# Patient Record
Sex: FEMALE | Race: WHITE | Employment: UNEMPLOYED | URBAN - METROPOLITAN AREA
[De-identification: names, ages, dates, MRNs, and addresses within clinical notes are randomized per-mention and may not be internally consistent; named-entity substitution may affect disease eponyms.]

---

## 2022-01-02 ENCOUNTER — APPOINTMENT (OUTPATIENT)
Dept: CT IMAGING | Age: 67
End: 2022-01-02
Payer: MEDICARE

## 2022-01-02 ENCOUNTER — HOSPITAL ENCOUNTER (EMERGENCY)
Age: 67
Discharge: LEFT BEFORE DISCHARGE | End: 2022-01-02

## 2022-01-02 ENCOUNTER — APPOINTMENT (OUTPATIENT)
Dept: GENERAL RADIOLOGY | Age: 67
End: 2022-01-02
Payer: MEDICARE

## 2022-01-02 ENCOUNTER — HOSPITAL ENCOUNTER (EMERGENCY)
Age: 67
Discharge: HOME | End: 2022-01-02
Attending: SPECIALIST
Payer: MEDICARE

## 2022-01-02 VITALS
WEIGHT: 136 LBS | RESPIRATION RATE: 15 BRPM | HEIGHT: 64 IN | DIASTOLIC BLOOD PRESSURE: 88 MMHG | SYSTOLIC BLOOD PRESSURE: 147 MMHG | OXYGEN SATURATION: 96 % | HEART RATE: 90 BPM | BODY MASS INDEX: 23.22 KG/M2 | TEMPERATURE: 98 F

## 2022-01-02 VITALS
DIASTOLIC BLOOD PRESSURE: 86 MMHG | WEIGHT: 136 LBS | HEIGHT: 64 IN | OXYGEN SATURATION: 96 % | SYSTOLIC BLOOD PRESSURE: 162 MMHG | HEART RATE: 89 BPM | BODY MASS INDEX: 23.22 KG/M2 | TEMPERATURE: 98.6 F | RESPIRATION RATE: 18 BRPM

## 2022-01-02 DIAGNOSIS — S16.1XXA STRAIN OF NECK MUSCLE, INITIAL ENCOUNTER: ICD-10-CM

## 2022-01-02 DIAGNOSIS — S09.90XA CLOSED HEAD INJURY, INITIAL ENCOUNTER: Primary | ICD-10-CM

## 2022-01-02 DIAGNOSIS — S39.012A STRAIN OF LUMBAR REGION, INITIAL ENCOUNTER: ICD-10-CM

## 2022-01-02 PROCEDURE — 6370000000 HC RX 637 (ALT 250 FOR IP)

## 2022-01-02 PROCEDURE — 99283 EMERGENCY DEPT VISIT LOW MDM: CPT

## 2022-01-02 PROCEDURE — 72100 X-RAY EXAM L-S SPINE 2/3 VWS: CPT

## 2022-01-02 PROCEDURE — 72125 CT NECK SPINE W/O DYE: CPT

## 2022-01-02 PROCEDURE — 70450 CT HEAD/BRAIN W/O DYE: CPT

## 2022-01-02 RX ORDER — ACETAMINOPHEN 160 MG/5ML
SOLUTION ORAL
Status: COMPLETED
Start: 2022-01-02 | End: 2022-01-02

## 2022-01-02 RX ORDER — ACETAMINOPHEN 325 MG/1
650 TABLET ORAL ONCE
Status: DISCONTINUED | OUTPATIENT
Start: 2022-01-02 | End: 2022-01-02

## 2022-01-02 RX ORDER — ACETAMINOPHEN 160 MG/5ML
640 SOLUTION ORAL ONCE
Status: COMPLETED | OUTPATIENT
Start: 2022-01-02 | End: 2022-01-02

## 2022-01-02 RX ORDER — IBUPROFEN 600 MG/1
600 TABLET ORAL EVERY 6 HOURS PRN
Qty: 20 TABLET | Refills: 0 | Status: SHIPPED | OUTPATIENT
Start: 2022-01-02 | End: 2022-01-09

## 2022-01-02 RX ADMIN — ACETAMINOPHEN 650 MG: 160 SOLUTION ORAL at 21:36

## 2022-01-02 RX ADMIN — ACETAMINOPHEN 650 MG: 650 SOLUTION ORAL at 21:36

## 2022-01-02 ASSESSMENT — PAIN DESCRIPTION - DESCRIPTORS
DESCRIPTORS: POUNDING
DESCRIPTORS: ACHING

## 2022-01-02 ASSESSMENT — PAIN DESCRIPTION - LOCATION
LOCATION: HEAD;NECK
LOCATION: HEAD;COCCYX

## 2022-01-02 ASSESSMENT — PAIN DESCRIPTION - ORIENTATION
ORIENTATION: POSTERIOR
ORIENTATION: POSTERIOR

## 2022-01-02 ASSESSMENT — PAIN DESCRIPTION - PAIN TYPE
TYPE: ACUTE PAIN
TYPE: ACUTE PAIN

## 2022-01-02 ASSESSMENT — PAIN SCALES - GENERAL
PAINLEVEL_OUTOF10: 5
PAINLEVEL_OUTOF10: 8

## 2022-01-02 ASSESSMENT — PAIN DESCRIPTION - FREQUENCY: FREQUENCY: CONTINUOUS

## 2022-01-02 NOTE — ED NOTES
Pt left immediately after triage. Pt did not want to wait any longer.      Ru Castaneda, MARCK  01/02/22 9227

## 2022-01-03 NOTE — ED PROVIDER NOTES
past medical history of ALS (amyotrophic lateral sclerosis) ([facility] [address] SURGICAL HISTORY      has no past surgical history on file. CURRENT MEDICATIONS       Discharge Medication List as of 1/2/2022  9:28 PM          ALLERGIES     has No Known Allergies. FAMILY HISTORY     has no family status information on file. family history is not on file. SOCIAL HISTORY          PHYSICAL EXAM     INITIAL VITALS:  height is 5' 4.25\" (1.632 m) and weight is 61.7 kg (136 lb). Her oral temperature is 98.6 °F (37 °C). Her blood pressure is 162/86 (abnormal) and her pulse is 89. Her respiration is 18 and oxygen saturation is 96%. Physical Exam  Vitals and nursing note reviewed. Constitutional:       Appearance: She is well-developed. HENT:      Head: Normocephalic. Contusion present. No raccoon eyes or Christopher's sign. Nose: Nose normal.   Eyes:      Extraocular Movements: Extraocular movements intact. Pupils: Pupils are equal, round, and reactive to light. Cardiovascular:      Rate and Rhythm: Normal rate and regular rhythm. Heart sounds: Normal heart sounds. No murmur heard. Pulmonary:      Effort: Pulmonary effort is normal. No respiratory distress. Breath sounds: Normal breath sounds. Abdominal:      General: Bowel sounds are normal. There is no distension. Palpations: Abdomen is soft. Tenderness: There is no abdominal tenderness. Musculoskeletal:      Cervical back: Normal range of motion and neck supple. Spinous process tenderness and muscular tenderness present. Comments: Patient has vague tenderness in the lower lumbar region more in the left paraspinal area. There is no step-off defect. There is no point tenderness. Skin:     General: Skin is warm and dry. Findings: Abrasion present. Comments: Patient has abrasion in the right olecranon process area. She has full range of movements of the right elbow joint without any pain or discomfort. Neurological:      General: No focal deficit present. Mental Status: She is alert and oriented to person, place, and time. DIFFERENTIAL DIAGNOSIS/ MDM:     Closed head injury, intracranial bleed, cervical strain, lumbar strain, fracture, right elbow abrasion    DIAGNOSTIC RESULTS     EKG: All EKG's are interpreted by the Emergency Department Physician who either signs or Co-signs this chart in the absence of a cardiologist.    None obtained    RADIOLOGY:   Interpretation per the Radiologist below, if available at the time of this note:    XR LUMBAR SPINE (2-3 VIEWS)    Result Date: 1/2/2022  EXAMINATION: THREE XRAY VIEWS OF THE LUMBAR SPINE 1/2/2022 4:49 pm COMPARISON: None. HISTORY: ORDERING SYSTEM PROVIDED HISTORY: Low back pain S/P Fall TECHNOLOGIST PROVIDED HISTORY: Low back pain S/P Fall Reason for Exam: low back pain, fall on ice today. Relevant Medical/Surgical History: hx of ALS FINDINGS: No fractures are seen. There is mild anterior subluxation of L4 on L5. There is disc space narrowing with eburnation of the vertebral endplates at the T1-3, N3-3, L3-4, L4-5 and L5-S1 levels. There is sclerosis of the facet joints in the mid and lower lumbar spine. The posterior elements are otherwiseintact. The paraspinal soft tissues are unremarkableexcept for atherosclerotic changes. No acute bony abnormalities are noted Grade 1 spondylolisthesis of L4 on L5. Multilevel spondylosis and degenerative disc disease. Facet arthropathy     CT Head WO Contrast    Result Date: 1/2/2022  EXAMINATION: CT OF THE CERVICAL SPINE WITHOUT CONTRAST; CT OF THE HEAD WITHOUT CONTRAST 1/2/2022 4:52 pm TECHNIQUE: CT of the cervical spine was performed without the administration of intravenous contrast. Multiplanar reformatted images are provided for review. Dose modulation, iterative reconstruction, and/or weight based adjustment of the mA/kV was utilized to reduce the radiation dose to as low as reasonably achievable. ; CT of the head was performed without the administration of intravenous contrast. Dose modulation, iterative reconstruction, and/or weight based adjustment of the mA/kV was utilized to reduce the radiation dose to as low as reasonably achievable. COMPARISON: None. HISTORY: ORDERING SYSTEM PROVIDED HISTORY: Slipped, neck pain TECHNOLOGIST PROVIDED HISTORY: Slipped, neck pain Decision Support Exception - unselect if not a suspected or confirmed emergency medical condition->Emergency Medical Condition (MA) Reason for Exam: slipped on ice today striking posterior head.; ORDERING SYSTEM PROVIDED HISTORY: Slipped on ice and fell TECHNOLOGIST PROVIDED HISTORY: Slipped on ice and fell Decision Support Exception - unselect if not a suspected or confirmed emergency medical condition->Emergency Medical Condition (MA) Reason for Exam: slipped on ice today striking posterior head. Additional signs and symptoms: headache CT BRAIN FINDINGS: BRAIN/VENTRICLES: The cerebral hemispheres, brainstem, and cerebellum have a normal appearance for the patient's age. The falx is midline. The ventricles and peripheral sulci are mildly dilated. There is decreased attenuation in the periventricular white matter. There is no sign of a space occupying lesion, infarction, or hemorrhage. Orbits: Portion of the orbits demonstrate no acute abnormality. SINUSES: . The remaining imaged portions of the paranasal sinuses are clear. The mastoids and the middle ear chambers are clear. SOFT TISSUES/SKULL:  No acute abnormality of the visualized skull. Soft tissue swelling along the posterior parietal region near the vertex CT CERVICAL SPINE FINDINGS: The cervical spine demonstrates decreasedmineralization with mild reversal of the  cervical lordosis. There is no evidence of fracture or subluxation. There is loss of disc height with eburnation of the vertebral endplates at the R8-8, G3-5, C5-6, C6-7 and C7-V8xzgxyy.   There are anterior and posterior marginal osteophytes at multiple levels. Mild spinal stenosis at C4-5, C5-6 and C6-7. There is bilateral facet hypertrophy at multiple levels throughout the cervical spine. The pedicles and posterior elements are otherwise intact. The prevertebral and paravertebral soft tissues are unremarkable. The atlanto-dens interval and dens are intact. The visualized lung apices are clear. Mild central and cortical cerebral atrophy. Mild chronic deep white matter ischemic changes Posterior parietal scalp hematoma No acute intracranial abnormalities are noted. Multilevel cervical spondylosis and degenerative disc disease. Mild spinal stenosis at C4-5, C5-6 and C6-7. Evidence of paracervical spasm. No acute bony abnormalities are noted RECOMMENDATIONS: Further evaluation should be obtained with MR imaging if clinically indicated. RECOMMENDATIONS: Unavailable     CT Cervical Spine WO Contrast    Result Date: 1/2/2022  EXAMINATION: CT OF THE CERVICAL SPINE WITHOUT CONTRAST; CT OF THE HEAD WITHOUT CONTRAST 1/2/2022 4:52 pm TECHNIQUE: CT of the cervical spine was performed without the administration of intravenous contrast. Multiplanar reformatted images are provided for review. Dose modulation, iterative reconstruction, and/or weight based adjustment of the mA/kV was utilized to reduce the radiation dose to as low as reasonably achievable.; CT of the head was performed without the administration of intravenous contrast. Dose modulation, iterative reconstruction, and/or weight based adjustment of the mA/kV was utilized to reduce the radiation dose to as low as reasonably achievable. COMPARISON: None.  HISTORY: ORDERING SYSTEM PROVIDED HISTORY: Slipped, neck pain TECHNOLOGIST PROVIDED HISTORY: Slipped, neck pain Decision Support Exception - unselect if not a suspected or confirmed emergency medical condition->Emergency Medical Condition (MA) Reason for Exam: slipped on ice today striking posterior head.; ORDERING SYSTEM PROVIDED HISTORY: Slipped on ice and fell TECHNOLOGIST PROVIDED HISTORY: Slipped on ice and fell Decision Support Exception - unselect if not a suspected or confirmed emergency medical condition->Emergency Medical Condition (MA) Reason for Exam: slipped on ice today striking posterior head. Additional signs and symptoms: headache CT BRAIN FINDINGS: BRAIN/VENTRICLES: The cerebral hemispheres, brainstem, and cerebellum have a normal appearance for the patient's age. The falx is midline. The ventricles and peripheral sulci are mildly dilated. There is decreased attenuation in the periventricular white matter. There is no sign of a space occupying lesion, infarction, or hemorrhage. Orbits: Portion of the orbits demonstrate no acute abnormality. SINUSES: . The remaining imaged portions of the paranasal sinuses are clear. The mastoids and the middle ear chambers are clear. SOFT TISSUES/SKULL:  No acute abnormality of the visualized skull. Soft tissue swelling along the posterior parietal region near the vertex CT CERVICAL SPINE FINDINGS: The cervical spine demonstrates decreasedmineralization with mild reversal of the  cervical lordosis. There is no evidence of fracture or subluxation. There is loss of disc height with eburnation of the vertebral endplates at the P8-9, E3-4, C5-6, C6-7 and C7-A6jltwth. There are anterior and posterior marginal osteophytes at multiple levels. Mild spinal stenosis at C4-5, C5-6 and C6-7. There is bilateral facet hypertrophy at multiple levels throughout the cervical spine. The pedicles and posterior elements are otherwise intact. The prevertebral and paravertebral soft tissues are unremarkable. The atlanto-dens interval and dens are intact. The visualized lung apices are clear. Mild central and cortical cerebral atrophy. Mild chronic deep white matter ischemic changes Posterior parietal scalp hematoma No acute intracranial abnormalities are noted.  Multilevel cervical spondylosis and degenerative disc disease. Mild spinal stenosis at C4-5, C5-6 and C6-7. Evidence of paracervical spasm. No acute bony abnormalities are noted RECOMMENDATIONS: Further evaluation should be obtained with MR imaging if clinically indicated. RECOMMENDATIONS: Unavailable       LABS:  No results found for this visit on 01/02/22. EMERGENCY DEPARTMENT COURSE:   Vitals:    Vitals:    01/02/22 1944   BP: (!) 162/86   Pulse: 89   Resp: 18   Temp: 98.6 °F (37 °C)   TempSrc: Oral   SpO2: 96%   Weight: 61.7 kg (136 lb)   Height: 5' 4.25\" (1.632 m)     -------------------------  BP: (!) 162/86, Temp: 98.6 °F (37 °C), Pulse: 89, Resp: 18    Orders Placed This Encounter   Medications    DISCONTD: Tetanus-Diphth-Acell Pertussis (BOOSTRIX) injection 0.5 mL    DISCONTD: acetaminophen (TYLENOL) tablet 650 mg    acetaminophen (TYLENOL) 160 MG/5ML solution 640 mg    acetaminophen (TYLENOL) 160 MG/5ML solution     TYLER BERNARDO: cabinet override    ibuprofen (IBU) 600 MG tablet     Sig: Take 1 tablet by mouth every 6 hours as needed for Pain     Dispense:  20 tablet     Refill:  0         During the emergency department course, patient was given Tylenol 640 mg solution. She has been resting comfortably and does not appear to be in any pain or distress prior to discharge. She remained hemodynamically stable and neurologically intact. She is advised to apply ice packs locally, take Tylenol and/or ibuprofen as needed for the pain, follow-up with PCP, return if worse. CONSULTS:  None    PROCEDURES:  None    FINAL IMPRESSION      1. Closed head injury, initial encounter    2. Strain of neck muscle, initial encounter    3. Strain of lumbar region, initial encounter          DISPOSITION/PLAN       PATIENT REFERRED TO:  Alena Ramos MD  [address]  [phone]    Call in 2 days  For reevaluation of current symptoms    [facility] ED  [address] [facility].   [address] [place] [address]  [phone]    If symptoms